# Patient Record
Sex: MALE | Race: OTHER | NOT HISPANIC OR LATINO
[De-identification: names, ages, dates, MRNs, and addresses within clinical notes are randomized per-mention and may not be internally consistent; named-entity substitution may affect disease eponyms.]

---

## 2022-02-17 PROBLEM — Z00.00 ENCOUNTER FOR PREVENTIVE HEALTH EXAMINATION: Status: ACTIVE | Noted: 2022-02-17

## 2022-02-18 ENCOUNTER — APPOINTMENT (OUTPATIENT)
Dept: ORTHOPEDIC SURGERY | Facility: CLINIC | Age: 27
End: 2022-02-18
Payer: MEDICAID

## 2022-02-18 VITALS
HEIGHT: 66 IN | HEART RATE: 57 BPM | WEIGHT: 152 LBS | BODY MASS INDEX: 24.43 KG/M2 | SYSTOLIC BLOOD PRESSURE: 113 MMHG | DIASTOLIC BLOOD PRESSURE: 76 MMHG

## 2022-02-18 DIAGNOSIS — M25.512 PAIN IN LEFT SHOULDER: ICD-10-CM

## 2022-02-18 DIAGNOSIS — S49.92XA UNSPECIFIED INJURY OF LEFT SHOULDER AND UPPER ARM, INITIAL ENCOUNTER: ICD-10-CM

## 2022-02-18 PROCEDURE — 73030 X-RAY EXAM OF SHOULDER: CPT | Mod: LT

## 2022-02-18 PROCEDURE — 99203 OFFICE O/P NEW LOW 30 MIN: CPT

## 2022-02-18 NOTE — HISTORY OF PRESENT ILLNESS
[de-identified] : 26 year old RHD male, avid weightlifter/boxer, presents with left shoulder pain after he threw a punch while boxing 2 days ago. He felt sudden sharp pain in the shoulder. He lifted weights afterwards and noticed clicking in the shoulder with movement. He describes pain as 3/10 which worsens when reaching overhead or behind his back. He has not done anything to treat this problem. He denies history of prior shoulder problems.

## 2022-02-18 NOTE — PHYSICAL EXAM
[Rad] : radial 2+ and symmetric bilaterally [Normal] : Alert and in no acute distress [Poor Appearance] : well-appearing [Acute Distress] : not in acute distress [Obese] : obese [de-identified] : The patient has no respiratory distress. Mood and affect are normal. The patient is alert and oriented to person, place and time.\par Examination of the cervical spine demonstrates no tenderness, no deformity and no muscle spasm. Cervical spine rotation is 60° to the right, 60° to the left, 75° of extension and 45° of flexion. Neurologic exam of the upper extremities reveals intact sensation to light touch. Motor function is 5 over 5 in all groups. Deep tendon reflexes are 2+ and equal at the biceps, triceps and brachioradialis.\par Examination of the left shoulder demonstrates no swelling, no deformity and no tenderness. The shoulder is stable. Drop arm test is negative. [place] test is negative. Liftoff test is negative. Motor strength is 5 over 5 in all groups. Range of motion is full and identical to that of the right shoulder. Flexion is 160°, abduction 160°, external rotation 45° and internal rotation to the lower thoracic level.\par The elbows are stable and nontender.  The skin is intact.  There is no lymphedema. [de-identified] : AP, transscapular and axillary x-rays of the left shoulder demonstrate no fracture, no dislocation and no bony abnormality.

## 2022-02-18 NOTE — DISCUSSION/SUMMARY
[de-identified] : The patient has sustained an injury to his left shoulder.  He may have had a subluxation incident or just a sprain.  There is no suggestion of rotator cuff pathology or gross instability on today's exam.  I have discussed the injury with the patient.  I recommend he back off on his exercises for 2 weeks.  He especially should avoid boxing and hanging from a bar and lifting heavy weights.  I would like to reevaluate him in 2 weeks.  We may refer him for physical therapy depending on his progress.

## 2022-03-04 ENCOUNTER — APPOINTMENT (OUTPATIENT)
Dept: ORTHOPEDIC SURGERY | Facility: CLINIC | Age: 27
End: 2022-03-04